# Patient Record
Sex: FEMALE | Race: WHITE | NOT HISPANIC OR LATINO | Employment: FULL TIME | URBAN - METROPOLITAN AREA
[De-identification: names, ages, dates, MRNs, and addresses within clinical notes are randomized per-mention and may not be internally consistent; named-entity substitution may affect disease eponyms.]

---

## 2020-09-10 ENCOUNTER — OFFICE VISIT (OUTPATIENT)
Dept: URGENT CARE | Facility: PHYSICIAN GROUP | Age: 55
End: 2020-09-10

## 2020-09-10 VITALS
RESPIRATION RATE: 17 BRPM | TEMPERATURE: 99.5 F | HEIGHT: 67 IN | DIASTOLIC BLOOD PRESSURE: 100 MMHG | BODY MASS INDEX: 29.82 KG/M2 | WEIGHT: 190 LBS | OXYGEN SATURATION: 98 % | HEART RATE: 88 BPM | SYSTOLIC BLOOD PRESSURE: 148 MMHG

## 2020-09-10 DIAGNOSIS — M25.562 MEDIAL KNEE PAIN, LEFT: ICD-10-CM

## 2020-09-10 PROCEDURE — 99203 OFFICE O/P NEW LOW 30 MIN: CPT | Performed by: PHYSICIAN ASSISTANT

## 2020-09-10 RX ORDER — TRAZODONE HYDROCHLORIDE 100 MG/1
100 TABLET ORAL NIGHTLY
COMMUNITY
End: 2022-03-31 | Stop reason: SDUPTHER

## 2020-09-10 RX ORDER — HYDROCODONE BITARTRATE AND ACETAMINOPHEN 10; 325 MG/1; MG/1
1-2 TABLET ORAL EVERY 6 HOURS PRN
COMMUNITY
End: 2022-04-18

## 2020-09-10 ASSESSMENT — ENCOUNTER SYMPTOMS
CONSTIPATION: 0
EYE PAIN: 0
CHILLS: 0
COUGH: 0
MYALGIAS: 0
HEADACHES: 0
DIARRHEA: 0
FALLS: 1
NAUSEA: 0
SORE THROAT: 0
VOMITING: 0
SHORTNESS OF BREATH: 0
FEVER: 0
ABDOMINAL PAIN: 0

## 2020-09-10 ASSESSMENT — PAIN SCALES - GENERAL: PAINLEVEL: 6=MODERATE PAIN

## 2020-09-10 NOTE — PROGRESS NOTES
"Subjective:   Talya Gilbert is a 54 y.o. female who presents for Fall (2 nights ago, having ankle/ knee pain before and after fall, Left.)        HPI  ROS    PMH:  has no past medical history on file.  MEDS:   Current Outpatient Medications:   •  HYDROcodone/acetaminophen (NORCO)  MG Tab, Take 1-2 Tabs by mouth every 6 hours as needed., Disp: , Rfl:   •  traZODone (DESYREL) 50 MG Tab, Take 50 mg by mouth every evening., Disp: , Rfl:   ALLERGIES:   Allergies   Allergen Reactions   • Morphine      SURGHX: History reviewed. No pertinent surgical history.  SOCHX:  reports that she has been smoking. She has never used smokeless tobacco.  History reviewed. No pertinent family history.     Objective:   /100   Pulse 88   Temp 37.5 °C (99.5 °F)   Resp 17   Ht 1.702 m (5' 7\")   Wt 86.2 kg (190 lb)   SpO2 98%   BMI 29.76 kg/m²     Physical Exam      Assessment/Plan:   No diagnosis found.    Follow-up with primary care provider within 7-10 days.  If symptoms worsen or persist patient can return to clinic for reevaluation.  Red flags and emergency room precautions discussed. All side effects of medication discussed including allergic response, GI upset, tendon injury, etc. Patient confirmed understanding of information.    Please note that this dictation was created using voice recognition software. I have made every reasonable attempt to correct obvious errors, but I expect that there are errors of grammar and possibly content that I did not discover before finalizing the note.         "

## 2020-09-10 NOTE — PROGRESS NOTES
"Subjective:     Anastasiya Gilbert is a 54 y.o. female who presents for Fall (2 nights ago, having ankle/ knee pain before and after fall, Left.)      55 yo female presents c/o posterior heel pain of left heel which has been ongoing for several months but seems to be getting worse and then an acute fall 2 nights ago where she was descending the stairs and she felt her leg \"give out\" and she was able to brace against the railing but now she is experiencing left-sided medial knee pain.  She has been taking ibuprofen in addition to her prescribed narcotic pain medication and ice and has had swelling go down on the knee but she continues to feel pain.  She is ambulatory but notes pain with walking.  She is able to reproduce her knee pain with a weighted 10 to 20 degrees of flexion.  She has no previous history of knee pain.  She has no fevers or chills or other constitutional symptoms.  She is not noticed any swelling of the ankle.      Review of Systems   Constitutional: Negative for chills and fever.   HENT: Negative for congestion, ear pain and sore throat.    Eyes: Negative for pain.   Respiratory: Negative for cough and shortness of breath.    Cardiovascular: Negative for chest pain.   Gastrointestinal: Negative for abdominal pain, constipation, diarrhea, nausea and vomiting.   Genitourinary: Negative for dysuria.   Musculoskeletal: Positive for falls and joint pain. Negative for myalgias.   Skin: Negative for rash.   Neurological: Negative for headaches.        CURRENT MEDICATIONS:  • HYDROcodone/acetaminophen Tabs  • traZODone Tabs    Allergies:     Allergies   Allergen Reactions   • Morphine        Current Problems: Anastasiya Gilbert does not have a problem list on file.  Past Surgical Hx:  No past surgical history on file.   Past Social Hx:  reports that she has been smoking. She has never used smokeless tobacco.   Past Family Hx:  Anastasiya Gilbert family history is not on file.     (Allergies, " "Medications, & Tobacco/Substance Use were reconciled by the Medical Assistant and reviewed by myself. The family history is prepopulated)       Objective:     /100   Pulse 88   Temp 37.5 °C (99.5 °F)   Resp 17   Ht 1.702 m (5' 7\")   Wt 86.2 kg (190 lb)   SpO2 98%   BMI 29.76 kg/m²     Physical Exam  Vitals signs reviewed.   Constitutional:       Appearance: Normal appearance.   HENT:      Head: Normocephalic and atraumatic.      Right Ear: External ear normal.      Left Ear: External ear normal.      Nose: Nose normal.      Mouth/Throat:      Mouth: Mucous membranes are moist.   Eyes:      Conjunctiva/sclera: Conjunctivae normal.   Cardiovascular:      Rate and Rhythm: Normal rate.   Pulmonary:      Effort: Pulmonary effort is normal.   Musculoskeletal:      Comments: Tender to palpation of the left knee on the medial joint line with trace edema.  No tenderness over the patella or other bony prominences near the posterior popliteal fossa.  No ligamentous laxity appreciated with a negative Lockman's however with Monica's patient has a catch which would suggest a meniscal injury.  Mild tenderness palpation over the calcaneus, no other bony tenderness of the ankle.  5 out of 5 strength of the hip knee ankle and foot.  Neurovascular intact distal to the wound.  No ecchymosis deformity or bruising.  Patient is ambulatory with an antalgic gait.   Skin:     General: Skin is warm and dry.      Capillary Refill: Capillary refill takes less than 2 seconds.   Neurological:      Mental Status: She is alert and oriented to person, place, and time.         Assessment/Plan:     Diagnosis and associated orders:     1. Medial knee pain, left        Comments/MDM:     After thorough examination including specialized exam testing as well as trial of ambulation I began to discuss with the patient the risk versus benefits and necessity of x-ray imaging.  She became quite concerned about the cost of the studies as she is " "self-pay and does not have insurance.  I begin to discuss alternatives to x-ray imaging including watchful waiting when she became quite tearful.  I did my best to reassure her and suggested that I return in a few minutes to give her time to compose herself and she kept repeating \"I cannot believe I wasted my time and money coming here\".  I began to explain to her that I thoroughly examined her and am trying to explain to her medication management, behavior alterations and suggesting therapies that are within her budget.  She became more upset and more tearful and states \"I would just like to leave\".  I suggested that she stay in the room and compose herself and I could return in a few minutes to provide  as well as further options and management.  She insisted upon leaving and so I escorted her out and told her to call if she has any questions and I am happy to answer with her over the phone.       Differential diagnosis, natural history, supportive care, and indications for immediate follow-up discussed.    Advised the patient to follow-up with the primary care physician for recheck, reevaluation, and consideration of further management.    Please note that this dictation was created using voice recognition software. I have made reasonable attempt to correct obvious errors, but I expect that there are errors of grammar and possibly content that I did not discover before finalizing the note.    This note was electronically signed by Kwan Young PA-C  "

## 2022-03-31 PROBLEM — J30.9 ALLERGIC RHINITIS: Status: ACTIVE | Noted: 2022-03-31

## 2022-03-31 PROBLEM — M25.50 MULTIPLE JOINT PAIN: Status: ACTIVE | Noted: 2022-03-31

## 2022-03-31 PROBLEM — K21.9 GASTROESOPHAGEAL REFLUX DISEASE: Status: ACTIVE | Noted: 2022-03-31

## 2022-03-31 PROBLEM — K64.8 INTERNAL HEMORRHOIDS: Status: ACTIVE | Noted: 2022-03-31

## 2022-03-31 PROBLEM — G47.33 OBSTRUCTIVE SLEEP APNEA OF ADULT: Status: ACTIVE | Noted: 2022-03-31

## 2022-03-31 PROBLEM — F31.9 BIPOLAR DISORDER (HCC): Status: ACTIVE | Noted: 2022-03-31

## 2022-03-31 PROBLEM — K44.9 DIAPHRAGMATIC HERNIA: Status: ACTIVE | Noted: 2022-03-31

## 2022-03-31 PROBLEM — M54.50 LOW BACK PAIN: Status: ACTIVE | Noted: 2022-03-31

## 2022-03-31 PROBLEM — N28.1 ACQUIRED CYSTIC KIDNEY DISEASE: Status: ACTIVE | Noted: 2022-03-31
